# Patient Record
Sex: FEMALE | Race: WHITE
[De-identification: names, ages, dates, MRNs, and addresses within clinical notes are randomized per-mention and may not be internally consistent; named-entity substitution may affect disease eponyms.]

---

## 2018-08-09 ENCOUNTER — HOSPITAL ENCOUNTER (INPATIENT)
Dept: HOSPITAL 7 - FB.MS | Age: 78
LOS: 13 days | Discharge: NURSING, REHAB OR LONG-TERM CARE | DRG: 948 | End: 2018-08-22
Attending: FAMILY MEDICINE | Admitting: FAMILY MEDICINE
Payer: MEDICARE

## 2018-08-09 DIAGNOSIS — K58.9: ICD-10-CM

## 2018-08-09 DIAGNOSIS — Z87.440: ICD-10-CM

## 2018-08-09 DIAGNOSIS — I10: ICD-10-CM

## 2018-08-09 DIAGNOSIS — Z88.8: ICD-10-CM

## 2018-08-09 DIAGNOSIS — R53.1: Primary | ICD-10-CM

## 2018-08-09 DIAGNOSIS — Z98.890: ICD-10-CM

## 2018-08-09 DIAGNOSIS — R51: ICD-10-CM

## 2018-08-09 DIAGNOSIS — Z86.73: ICD-10-CM

## 2018-08-09 DIAGNOSIS — E87.1: ICD-10-CM

## 2018-08-09 DIAGNOSIS — H35.30: ICD-10-CM

## 2018-08-09 DIAGNOSIS — E78.00: ICD-10-CM

## 2018-08-09 DIAGNOSIS — E03.9: ICD-10-CM

## 2018-08-09 DIAGNOSIS — M81.0: ICD-10-CM

## 2018-08-09 DIAGNOSIS — M54.9: ICD-10-CM

## 2018-08-09 DIAGNOSIS — H91.90: ICD-10-CM

## 2018-08-09 DIAGNOSIS — Z66: ICD-10-CM

## 2018-08-09 DIAGNOSIS — Z11.1: ICD-10-CM

## 2018-08-09 DIAGNOSIS — F03.90: ICD-10-CM

## 2018-08-09 DIAGNOSIS — F41.9: ICD-10-CM

## 2018-08-09 DIAGNOSIS — M79.7: ICD-10-CM

## 2018-08-09 DIAGNOSIS — R47.9: ICD-10-CM

## 2018-08-09 DIAGNOSIS — M19.90: ICD-10-CM

## 2018-08-09 DIAGNOSIS — G89.29: ICD-10-CM

## 2018-08-09 DIAGNOSIS — K21.9: ICD-10-CM

## 2018-08-09 DIAGNOSIS — K29.70: ICD-10-CM

## 2018-08-09 RX ADMIN — POLYVINYL ALCOHOL SCH DROP: 14 SOLUTION/ DROPS OPHTHALMIC at 21:21

## 2018-08-09 NOTE — PCM.HP
H&P History of Present Illness





- General


Date of Service: 08/09/18


Admit Problem/Dx: 


 Admission Diagnosis/Problem





Admission Diagnosis/Problem      Stroke occurring within last month








Source of Information: Patient, Family, Old Records





- History of Present Illness


Initial Comments - Free Text/Narative: 


Assist 77-year-old female patient that had a bleeding stroke. She is 

transferred to [facility] and had a craniotomy. This is about a week ago. She is 

found to be hyponatremic and was put on a fluid restriction and odium pills. 

Her son states she was on to sodium pills twice a day until yesterday went to 3 

times a day. Patient has no concerns. She feels that she has a bit of weakness 

all over more than the right side. She has problems double vision. She denies 

dysphagia, aphasia, headaches, fevers, chills. Before she had the stroke she 

had a EGD that showed gastritis per Dr. Mohs. Still having problems with her 

stomach a little bit.





  ** left parietal head


Pain Score (Numeric/FACES): 5





- Related Data


Allergies/Adverse Reactions: 


 Allergies











Allergy/AdvReac Type Severity Reaction Status Date / Time


 


citalopram [From Celexa] Allergy  Nausea Verified 08/02/18 16:59


 


esomeprazole [From Nexium] Allergy  Other Verified 08/02/18 16:59


 


omeprazole Allergy  Other Verified 08/02/18 16:59


 


propranolol Allergy  Swelling Verified 08/02/18 16:59


 


ranitidine [From Zantac] Allergy  Other Verified 08/02/18 17:00


 


topiramate [From Topamax] Allergy  Swelling Verified 08/02/18 17:00











Home Medications: 


 Home Meds





Alendronate Sodium [Fosamax] 70 mg PO TO 08/02/18 [History]


Cholecalciferol (Vitamin D3) [D3-2000] 2,000 unit PO DAILY 08/02/18 [History]


Donepezil HCl 10 mg PO DAILY 08/02/18 [History]


Levothyroxine 75 mcg PO DAILY@0600 08/02/18 [History]


Loperamide HCl [Loperamide] 2 mg PO QID PRN 08/02/18 [History]


Omeprazole 20 mg PO DAILY@1700 08/02/18 [History]


Sodium Chloride 2 gm PO TIDMEALS 08/02/18 [History]


Sucralfate 1 gm PO QIDACANDBED 08/02/18 [History]


atorvaSTATin [Lipitor] 10 mg PO BEDTIME 08/02/18 [History]


Acetaminophen [Acetaminophen Extra Strength] 500 mg PO Q6H PRN 08/09/18 [History

]


Aded Eye Vitamin 1 cap PO DAILY 08/09/18 [History]


Alum Hydrox/Mag Hydrox/Simeth [Maalox Advanced] 15 ml PO DAILY PRN 08/09/18 [

History]


L.acidoph,Paracasei, B.lactis [Probiotic] 1 cap PO DAILY 08/09/18 [History]


LORazepam 0.5 mg PO Q8H PRN 08/09/18 [History]


Loratadine 10 mg PO DAILY 08/09/18 [History]


Magnesium Amino Acid Chelate [Magnesium] 100 mg PO DAILY 08/09/18 [History]


Mineral Oil, Light/Mineral Oil [Soothe Xp Eye Drops] 1 drop EYEBOTH TID 08/09/ 18 [History]


Psyllium [Metamucil] 1.04 gm PO DAILY PRN 08/09/18 [History]


Sodium Chloride/Sodium Bicarb [Sinus Wash Kettle Neti Pot Kit] 1 dose ANUP 

ASDIRECTED PRN 08/09/18 [History]


levETIRAcetam [Keppra] 500 mg PO Q12H 08/09/18 [History]











Past Medical History


HEENT History: Reports: Hard of Hearing, Macular Degeneration, Sinusitis


Cardiovascular History: Reports: High Cholesterol, Hypertension


Gastrointestinal History: Reports: Gastritis, GERD, Hemorrhoids, Irritable 

Bowel Syndrome, Other (See Below)


Other Gastrointestinal History: Diverticulitis


Genitourinary History: Reports: UTI, Recurrent, Other (See Below)


Other Genitourinary History: Hiatal hernia; Urethral Stricture Dilatation


OB/GYN History: Reports: Pregnancy, Other (See Below)


Other OB/BYN History: breast biopsy


Musculoskeletal History: Reports: Arthritis, Back Pain, Chronic, Fibromyalgia, 

Neck Pain, Chronic, Osteoporosis, Other (See Below)


Other Musculoskeletal History: Baker's cyst Right knee; dislocation of TMJ;


Neurological History: Reports: Brain Injury, Headaches, Chronic, Migraines, 

Other (See Below)


Other Neuro History: dementia; hx of intracerebral hemorrhage s/p 8-3-18; 

cervical spine 5 and 6 degeneration


Psychiatric History: Reports: Anxiety, Dementia, Depression


Endocrine/Metabolic History: Reports: Hypothyroidism, Osteoporosis





- Infectious Disease History


Infectious Disease History: Reports: Chicken Pox, Measles, Mumps





- Past Surgical History


Head Surgeries/Procedures: Reports: None


HEENT Surgical History: Reports: Adenoidectomy, Cataract Surgery, Tonsillectomy


Cardiovascular Surgical History: Reports: None


GI Surgical History: Reports: Cholecystectomy, Colonoscopy, EGD, Other (See 

Below)


Other GI Surgeries/Procedures: Tonsillectomy


Female  Surgical History: Reports: D&C, Hysterectomy


Endocrine Surgical History: Reports: None


Neurological Surgical History: Reports: None


Musculoskeletal Surgical History: Reports: Carpal Tunnel, Other (See Below)


Other Musculoskeletal Surgeries/Procedures:: jaskaran wrists; foot surgery





Social & Family History





- Family History


Family Medical History: Noncontributory





- Tobacco Use


Smoking Status *Q: Never Smoker


Second Hand Smoke Exposure: No





- Caffeine Use


Caffeine Use: Reports: None





- Recreational Drug Use


Recreational Drug Use: No





H&P Review of Systems





- Review of Systems:


Review Of Systems: See Below


General: Reports: No Symptoms


HEENT: Reports: Visual Changes


Pulmonary: Reports: No Symptoms


Cardiovascular: Reports: No Symptoms


Gastrointestinal: Reports: Abdominal Pain


Genitourinary: Reports: No Symptoms


Musculoskeletal: Reports: No Symptoms


Skin: Reports: No Symptoms


Psychiatric: Reports: No Symptoms


Neurological: Reports: Weakness


Hematologic/Lymphatic: Reports: No Symptoms


Immunologic: Reports: No Symptoms





Exam





- Exam


Exam: See Below





- Vital Signs


Vital Signs: 


 Last Vital Signs











Temp  98 F   08/09/18 13:50


 


Pulse  77   08/09/18 13:50


 


Resp  18   08/09/18 13:50


 


BP  113/65   08/09/18 13:50


 


Pulse Ox  99   08/09/18 14:00











Weight: 140 lb 1.6 oz





- Exam


General: Alert, Cooperative


HEENT: Hearing Intact, Mucosa Moist & Pink, Posterior Pharynx Clear


Neck: Supple, Trachea Midline


Lungs: Clear to Auscultation, Normal Respiratory Effort


Cardiovascular: Regular Rate, Regular Rhythm, Normal S1, Normal S2.  No: 

Bradycardia, Tachycardia


GI/Abdominal Exam: Normal Bowel Sounds, Soft, Non-Tender, No Organomegaly, No 

Distention, No Mass


Back Exam: Normal Inspection, Full Range of Motion


Extremities: Normal Inspection, Normal Range of Motion, Non-Tender, No Pedal 

Edema


Skin: Rash (Groin erythema)


Neurological: Normal Speech, Normal Tone, Other (Walks adequately with a walker 

and assist.)


Psychiatric: Alert, Normal Mood





- Problem List


(1) S/P craniotomy


SNOMED Code(s): 230763512, 52783911, 111962177


   ICD Code: Z98.890 - OTHER SPECIFIED POSTPROCEDURAL STATES   Status: Acute   

Current Visit: Yes   





(2) CVA (cerebrovascular accident due to intracerebral hemorrhage)


SNOMED Code(s): 929912501


   ICD Code: I61.9 - NONTRAUMATIC INTRACEREBRAL HEMORRHAGE, UNSPECIFIED   Status

: Acute   Current Visit: Yes   





(3) Gastritis


SNOMED Code(s): 5683854


   ICD Code: K29.70 - GASTRITIS, UNSPECIFIED, WITHOUT BLEEDING   Status: Acute 

  Current Visit: Yes   





(4) Tinea cruris


SNOMED Code(s): 314099225


   ICD Code: B35.6 - TINEA CRURIS   Status: Acute   Current Visit: Yes   





(5) Hyponatremia


SNOMED Code(s): 82835863


   ICD Code: E87.1 - HYPO-OSMOLALITY AND HYPONATREMIA   Status: Acute   Current 

Visit: Yes   


Problem List Initiated/Reviewed/Updated: Yes


Orders Last 24hrs: 


 Active Orders 24 hr











 Category Date Time Status


 


 Patient Status [ADT] Routine ADT  08/09/18 15:12 Active


 


 Oxygen Therapy [RC] PRN Care  08/09/18 15:12 Active


 


 Up With Assistance [RC] 09,13,17,21 Care  08/09/18 15:12 Active


 


 Vital Signs [RC] 08 Care  08/09/18 15:12 Active


 


 OT Evaluation and Treatment [CONS] Routine Cons  08/09/18 15:12 Active


 


 PT Evaluation and Treatment [CONS] Routine Cons  08/09/18 15:12 Active


 


 Regular Diet [DIET] Diet  08/09/18 Dinner Active


 


 BASIC METABOLIC PANEL,BMP [CHEM] AM Lab  08/10/18 05:11 Ordered


 


 Acetaminophen [Tylenol Extra Strength] Med  08/09/18 15:14 Active





 500 mg PO Q6H PRN   


 


 Aded Eye Vitamin Med  08/10/18 09:00 Hold





 1 cap PO DAILY   


 


 Alendronate [Fosamax] Med  08/12/18 06:00 Active





 70 mg PO To@0600   


 


 Alum Hydroxide/Mag Hydroxide [Mag-Al Susp] Med  08/09/18 15:25 Active





 15 ml PO DAILY PRN   


 


 Donepezil [Aricept] Med  08/10/18 09:00 Active





 10 mg PO DAILY   


 


 LORazepam [Ativan] Med  08/09/18 15:14 Active





 0.5 mg PO Q8H PRN   


 


 Levothyroxine Med  08/10/18 06:00 Active





 75 mcg PO DAILY@0600   


 


 Loperamide [Imodium] Med  08/09/18 15:14 Active





 2 mg PO QID PRN   


 


 Loratadine [Claritin] Med  08/10/18 09:00 Active





 10 mg PO DAILY   


 


 Magnesium Oxide Med  08/10/18 09:00 Active





 400 mg PO DAILY   


 


 Pantoprazole [ProTONIX***] Med  08/09/18 17:00 Active





 40 mg PO DAILY@1700   


 


 Polyvinyl Alcohol [LiquiTears 1.4% Ophth Soln] Med  08/09/18 21:00 Active





 0 ml EYEBOTH TID   


 


 Psyllium [Metamucil] Med  08/09/18 15:14 Active





 1.04 gm PO DAILY PRN   


 


 Sodium Chloride Med  08/09/18 18:00 Active





 2 gm PO TIDMEALS   


 


 Sodium Chloride 0.65% [Ocean Nasal Spray] Med  08/09/18 15:30 Active





 0 ml ANUP ASDIRECTED PRN   


 


 Sucralfate [Carafate] Med  08/09/18 17:30 Active





 1 gm PO QIDACANDBED   


 


 atorvaSTATin [Lipitor] Med  08/09/18 21:00 Active





 10 mg PO BEDTIME   


 


 levETIRAcetam [Keppra] Med  08/09/18 21:00 Active





 500 mg PO Q12H   


 


 Staples Sutures Removal [RC] ROUTINE Oth  08/17/18 09:00 Active


 


 Resuscitation Status Routine Resus Stat  08/09/18 15:12 Ordered








 Medication Orders





Acetaminophen (Tylenol Extra Strength)  500 mg PO Q6H PRN


   PRN Reason: Pain


   Last Admin: 08/09/18 15:55  Dose: 500 mg


Al Hydroxide/Mg Hydroxide (Mag-Al Susp)  15 ml PO DAILY PRN


   PRN Reason: HEARTBURN/INDIGESTION


Alendronate Sodium (Fosamax)  70 mg PO To@0600 KANDY


Artificial Tears (Liquitears 1.4% Ophth Soln)  0 ml EYEBOTH TID KANDY


Atorvastatin Calcium (Lipitor)  10 mg PO BEDTIME KANDY


Donepezil HCl (Aricept)  10 mg PO DAILY KANDY


Levetiracetam (Keppra)  500 mg PO Q12H KANDY


Levothyroxine Sodium (Levothyroxine)  75 mcg PO DAILY@0600 [facility]


Loperamide HCl (Imodium)  2 mg PO QID PRN


   PRN Reason: Diarrhea


Loratadine (Claritin)  10 mg PO DAILY [facility]


Lorazepam (Ativan)  0.5 mg PO Q8H PRN


   PRN Reason: Anxiety


Magnesium Oxide (Magnesium Oxide)  400 mg PO DAILY [facility]


Non-Formulary Medication (Aded Eye Vitamin)  1 cap PO DAILY [facility]


Pantoprazole Sodium (Protonix***)  40 mg PO DAILY@1700 [facility]


   Last Admin: 08/09/18 17:25  Dose: 40 mg


Psyllium Hydrophilic Mucilloid (Metamucil)  1.04 gm PO DAILY PRN


   PRN Reason: Constipation


Sodium Chloride (Ocean Nasal Spray)  0 ml ANUP ASDIRECTED PRN


   PRN Reason: DRYNESS/CONGESTION


Sodium Chloride (Sodium Chloride)  2 gm PO TIDMEALS [facility]


   Last Admin: 08/09/18 17:25  Dose: 2 gm


Sucralfate (Carafate)  1 gm PO QIDACANDBED [facility]


   Last Admin: 08/09/18 17:25  Dose: 1 gm








Assessment/Plan Comment:: 


1. Admit to swing bed. 


2. PT/OT


3. She was on fluid restriction. We'll stop it for now and continue the 2 tabs 

of sodium pills 3 times a day and daily BMP


4. Medication reviewed and restarted.


5. Regular diet


6. Up with assist


7. Essential set up an appointment in the future with nephrology.

## 2018-08-10 RX ADMIN — POLYVINYL ALCOHOL SCH DROP: 14 SOLUTION/ DROPS OPHTHALMIC at 14:53

## 2018-08-10 RX ADMIN — POLYVINYL ALCOHOL SCH DROP: 14 SOLUTION/ DROPS OPHTHALMIC at 08:51

## 2018-08-10 RX ADMIN — POLYVINYL ALCOHOL SCH DROP: 14 SOLUTION/ DROPS OPHTHALMIC at 21:06

## 2018-08-10 RX ADMIN — BUTALBITAL, ACETAMINOPHEN, AND CAFFEINE PRN TAB: 50; 325; 40 TABLET ORAL at 19:46

## 2018-08-10 NOTE — PN
DATE SEEN:  08/10/2018

 

HISTORY OF PRESENT ILLNESS:  Lydia Paul is a 77-year-old,  female,

, lives in [place].

 

Son, Shalom, daughter, Nara.

 

She was admitted postsurgically.  Presented with intracranial bleed, was seen at

[facility] in [place], underwent craniotomy, week prior to admission.  Hyponatremic

fluid restrictions, discharge for concern.

 

Primary issues were some visual disturbances and generalized weakness.  Speech

therapy under consideration.

 

DIAGNOSTIC STUDIES:  Sodium 132, chloride 97, normal GFR on 08/10/2018.

 

Voices no complaints.

 

PHYSICAL EXAMINATION:  VITAL SIGNS: 37.0, 76 is the pulse, 134/60, 14 is

respirations, 99%.  GENERAL:  Cooperative, conversant.  NEUROLOGIC:  Vision in

question.  No focal facial weakness.  Cranial nerves 2-12 are intact.

EXTREMITIES:  Well perfused.  Strength was symmetric, no focal weakness.  CHEST:

Clear.  HEART: Regular.  ABDOMEN:  Benign.  Craniotomy scar well healing.

 

ASSESSMENT:  Craniotomy, intracranial bleed.

 

PLAN:  PT, OT, complementary care and well being, , speech

therapy will be provided.

 

Job#: [id number]

DD: 08/10/2018 1136

DT: 08/10/2018 1341 LIZA/TATI

## 2018-08-11 RX ADMIN — POLYVINYL ALCOHOL SCH DROP: 14 SOLUTION/ DROPS OPHTHALMIC at 08:40

## 2018-08-11 RX ADMIN — BUTALBITAL, ACETAMINOPHEN, AND CAFFEINE PRN TAB: 50; 325; 40 TABLET ORAL at 01:20

## 2018-08-11 RX ADMIN — BUTALBITAL, ACETAMINOPHEN, AND CAFFEINE PRN TAB: 50; 325; 40 TABLET ORAL at 17:04

## 2018-08-11 RX ADMIN — POLYVINYL ALCOHOL SCH DROP: 14 SOLUTION/ DROPS OPHTHALMIC at 13:47

## 2018-08-11 RX ADMIN — BUTALBITAL, ACETAMINOPHEN, AND CAFFEINE PRN TAB: 50; 325; 40 TABLET ORAL at 08:54

## 2018-08-11 RX ADMIN — MICONAZOLE NITRATE SCH APPLIC: 20 CREAM VAGINAL at 20:49

## 2018-08-11 RX ADMIN — POLYVINYL ALCOHOL SCH DROP: 14 SOLUTION/ DROPS OPHTHALMIC at 20:49

## 2018-08-11 NOTE — PN
DATE SEEN:  08/11/2018

 

Lydia Paul is a 77-year-old,  female, admitted for post craniotomy

care.

 

Had an intracranial bleed.

 

In today for therapy and rehab.

 

Doing well.  Some evening headaches have improved, reviewed records from

[facility].  Fioricet had been beneficial.  Tylenol was reduced and discontinued

due to Tylenol potential risk, Ultram p.r.n. for pain.  Otherwise been feeling

well.  Laboratory studies none new.

 

Medications reviewed.  Timing appropriate.

 

PHYSICAL EXAMINATION:  VITAL SIGNS:  63.54 kg, 37 degrees, pulse 71, 134/60, 98%

room air.  GENERAL:  Cooperative, conversant, soft spoken.  SKIN:  Craniotomy

scar well healed.  HEENT:  Fundi benign, conjunctivae clear.  Bright tympanic

membranes.  Mouth and oropharynx clear.  CHEST:  Clear in all lung fields.

HEART:  Regular without ectopy or murmur.  ABDOMEN:  Benign.

 

ASSESSMENT:  Rehab, post craniotomy.

 

PLAN:  Medications, care and treatment appropriate, analgesics on board.

Cooperative care and well being.

 

Job#: [id number]

DD: 08/11/2018 1133

DT: 08/11/2018 1243 /TATI

## 2018-08-12 RX ADMIN — POLYVINYL ALCOHOL SCH DROP: 14 SOLUTION/ DROPS OPHTHALMIC at 14:38

## 2018-08-12 RX ADMIN — BUTALBITAL, ACETAMINOPHEN, AND CAFFEINE PRN TAB: 50; 325; 40 TABLET ORAL at 06:51

## 2018-08-12 RX ADMIN — BUTALBITAL, ACETAMINOPHEN, AND CAFFEINE PRN TAB: 50; 325; 40 TABLET ORAL at 17:41

## 2018-08-12 RX ADMIN — POLYVINYL ALCOHOL SCH DROP: 14 SOLUTION/ DROPS OPHTHALMIC at 08:02

## 2018-08-12 RX ADMIN — MICONAZOLE NITRATE SCH APPLIC: 20 CREAM VAGINAL at 20:48

## 2018-08-12 RX ADMIN — BUTALBITAL, ACETAMINOPHEN, AND CAFFEINE PRN TAB: 50; 325; 40 TABLET ORAL at 14:40

## 2018-08-12 RX ADMIN — POLYVINYL ALCOHOL SCH DROP: 14 SOLUTION/ DROPS OPHTHALMIC at 20:48

## 2018-08-12 RX ADMIN — BUTALBITAL, ACETAMINOPHEN, AND CAFFEINE PRN TAB: 50; 325; 40 TABLET ORAL at 11:28

## 2018-08-12 NOTE — PN
DATE SEEN:  08/12/2018

 

SUBJECTIVE:  Lydia Paul is a 77-year-old,  female, seen here for

followup.  Underwent a craniotomy for intracranial bleed.

 

Performed at [facility].

 

Here for swing bed purposes.

 

History of low sodium during her hospital stay.

 

LABORATORY STUDIES:  From here 08/10/2018, sodium 132, potassium 4.0, GFR

greater than 60.

 

OBJECTIVE:  VITAL SIGNS:  36.3, 94, 128/68, respirations 16, O2 saturation 96%.

GENERAL:  Soft spoken.  HEENT:  Craniotomy site, staples in place, left

hemicranium intact.  NECK:  Benign.  Thyroid small.  CHEST:  Clear in all lung

fields.  CARDIAC:  Heart regular.  ABDOMEN:  Benign.

 

ASSESSMENT:  Craniotomy, intracranial bleed.

 

PLAN:  Continue with present therapy.  Staples out on Friday.  Comfort care.

Bathing and showering consideration.

 

Job#: [id number]

DD: 08/12/2018 1138

DT: 08/12/2018 1409 LIZA/TATI

## 2018-08-13 RX ADMIN — TERBINAFINE HYDROCHLORIDE SCH APPLIC: 1 CREAM TOPICAL at 21:04

## 2018-08-13 RX ADMIN — BUTALBITAL, ACETAMINOPHEN, AND CAFFEINE PRN TAB: 50; 325; 40 TABLET ORAL at 00:33

## 2018-08-13 RX ADMIN — POLYVINYL ALCOHOL SCH DROP: 14 SOLUTION/ DROPS OPHTHALMIC at 15:38

## 2018-08-13 RX ADMIN — POLYVINYL ALCOHOL SCH DROP: 14 SOLUTION/ DROPS OPHTHALMIC at 08:50

## 2018-08-13 RX ADMIN — BUTALBITAL, ACETAMINOPHEN, AND CAFFEINE PRN TAB: 50; 325; 40 TABLET ORAL at 21:45

## 2018-08-13 RX ADMIN — TERBINAFINE HYDROCHLORIDE SCH APPLIC: 1 CREAM TOPICAL at 12:49

## 2018-08-13 RX ADMIN — MICONAZOLE NITRATE SCH APPLIC: 20 CREAM VAGINAL at 21:05

## 2018-08-13 RX ADMIN — POLYVINYL ALCOHOL SCH DROP: 14 SOLUTION/ DROPS OPHTHALMIC at 21:05

## 2018-08-13 RX ADMIN — BUTALBITAL, ACETAMINOPHEN, AND CAFFEINE PRN TAB: 50; 325; 40 TABLET ORAL at 06:38

## 2018-08-13 RX ADMIN — BUTALBITAL, ACETAMINOPHEN, AND CAFFEINE PRN TAB: 50; 325; 40 TABLET ORAL at 15:38

## 2018-08-13 NOTE — PN
DATE SEEN:  08/13/2018

 

SUBJECTIVE:  Lydia Paul is a 77-year-old  female, presently in

swing bed.

 

Underwent craniotomy at .  In for rehab purposes.

 

Appears to be a little increase in decline in mentation and memory.

 

This had been ongoing prior to that time.

 

Physical Therapy is actively involved.

 

OBJECTIVE:  VITAL SIGNS:  36.6, 68 is the pulse, 119/64, 82 is the mean blood

pressure, 16 respirations, and O2 saturation 98%.  GENERAL:  Soft spoken, less

forgettable this morning.  NECK:  Benign.  Thyroid small.  CHEST:  Clear in all

lung fields.  HEART:  No ectopy or significant murmur.  ABDOMEN:  Benign.  HEAD:

Craniotomy, left lateral parietal region, staples in good position.

 

ASSESSMENT:  Postoperative care, intracranial bleed, therapy in place.

 

PLAN:  Medications, care and treatment appropriate.  Pain appears to be

controlled.  The patient is comfortable with well being.

 

Job#: [id number]

DD: 08/13/2018 0911

DT: 08/13/2018 1026 LIZA/TATI

## 2018-08-14 RX ADMIN — BUTALBITAL, ACETAMINOPHEN, AND CAFFEINE PRN TAB: 50; 325; 40 TABLET ORAL at 21:30

## 2018-08-14 RX ADMIN — TERBINAFINE HYDROCHLORIDE SCH APPLIC: 1 CREAM TOPICAL at 21:31

## 2018-08-14 RX ADMIN — POLYVINYL ALCOHOL SCH DROP: 14 SOLUTION/ DROPS OPHTHALMIC at 21:30

## 2018-08-14 RX ADMIN — BUTALBITAL, ACETAMINOPHEN, AND CAFFEINE PRN TAB: 50; 325; 40 TABLET ORAL at 15:00

## 2018-08-14 RX ADMIN — MICONAZOLE NITRATE SCH APPLIC: 20 CREAM VAGINAL at 21:32

## 2018-08-14 RX ADMIN — POLYVINYL ALCOHOL SCH DROP: 14 SOLUTION/ DROPS OPHTHALMIC at 14:30

## 2018-08-14 RX ADMIN — TERBINAFINE HYDROCHLORIDE SCH APPLIC: 1 CREAM TOPICAL at 08:53

## 2018-08-14 RX ADMIN — POLYVINYL ALCOHOL PRN DROP: 14 SOLUTION/ DROPS OPHTHALMIC at 16:45

## 2018-08-14 RX ADMIN — POLYVINYL ALCOHOL SCH DROP: 14 SOLUTION/ DROPS OPHTHALMIC at 08:53

## 2018-08-14 RX ADMIN — BUTALBITAL, ACETAMINOPHEN, AND CAFFEINE PRN TAB: 50; 325; 40 TABLET ORAL at 08:52

## 2018-08-14 NOTE — PN
DATE SEEN:  08/14/2018

 

SUBJECTIVE:  Lydia Paul is a 77-year-old  female, seen today for

followup.  Status post craniotomy for intracranial bleed.  Neurologically, i.e.

dementia, memory a problematic issue.

 

Discharge planning to another facility, other than home, is planned.

 

OBJECTIVE:  VITAL SIGNS:  37.7, 72, 120/61, mean blood pressure 80, 20

respirations, and 95%.  GENERAL:  Soft spoken.  Eye contact was good.  Speech

was soft spoken.  HEAD:  Surgical craniotomy wound, left parietal posterior

scalp, left side, intact.  NECK:  Benign.  CHEST:  Clear.  HEART:  Regular.

 

ASSESSMENT:  Craniotomy, intracranial bleed, stable; neurological well-being

declining.

 

PLAN:  As recommended, PT therapy, ongoing care, discharge to other facility

likely planned.

 

Job#: [id number]

DD: 08/14/2018 1130

DT: 08/14/2018 1147 LIZA/TATI

## 2018-08-15 RX ADMIN — MICONAZOLE NITRATE SCH APPLIC: 20 CREAM VAGINAL at 21:19

## 2018-08-15 RX ADMIN — POLYVINYL ALCOHOL SCH DROP: 14 SOLUTION/ DROPS OPHTHALMIC at 08:34

## 2018-08-15 RX ADMIN — POLYVINYL ALCOHOL SCH DROP: 14 SOLUTION/ DROPS OPHTHALMIC at 15:11

## 2018-08-15 RX ADMIN — POLYVINYL ALCOHOL SCH DROP: 14 SOLUTION/ DROPS OPHTHALMIC at 21:18

## 2018-08-15 RX ADMIN — TERBINAFINE HYDROCHLORIDE SCH APPLIC: 1 CREAM TOPICAL at 08:34

## 2018-08-15 RX ADMIN — TERBINAFINE HYDROCHLORIDE SCH APPLIC: 1 CREAM TOPICAL at 21:17

## 2018-08-15 NOTE — PN
DATE SEEN:  08/15/2018

 

HISTORY OF PRESENT ILLNESS:  Lydia Paul is a 77-year-old,  female, seen

today for followup.

 

Swing bed status.

 

Had previous craniotomy for intracranial bleed.

 

Progress has been slow.

 

Mentation well being an issue necessitating consideration for other living

situation.  Appetite has been off, eating reluctantly.  Ambulatory skills have

been under conflict.

 

Chiropractic visit planned today.

 

PHYSICAL EXAMINATION:  VITAL SIGNS: 37.6, 68, 123/59, 16, and 94%.  GENERAL:

Soft spoken.  HEENT:  Visual field noted.  NECK:  Benign.  CHEST:  Clear in all

lung fields.  HEART:  Without ectopy or murmur.  SKIN:  Incision healing without

conflict in left parietal area.  Suture removal planned on Friday.

 

ASSESSMENT:  Postoperative care, craniotomy, intracranial bleed.

 

Progressive decline in mental well being.

 

PLAN:  Local care, comfort measures, PT, discharge planning under consideration.

 

Job#: [id number]

DD: 08/15/2018 0908

DT: 08/15/2018 1050 LIZA/TATI

## 2018-08-16 RX ADMIN — BUTALBITAL, ACETAMINOPHEN, AND CAFFEINE SCH TAB: 50; 325; 40 TABLET ORAL at 21:04

## 2018-08-16 RX ADMIN — BUTALBITAL, ACETAMINOPHEN, AND CAFFEINE SCH TAB: 50; 325; 40 TABLET ORAL at 14:26

## 2018-08-16 RX ADMIN — ALUMINUM HYDROXIDE AND MAGNESIUM HYDROXIDE PRN ML: 200; 200 SUSPENSION ORAL at 10:06

## 2018-08-16 RX ADMIN — POLYVINYL ALCOHOL PRN DROP: 14 SOLUTION/ DROPS OPHTHALMIC at 17:16

## 2018-08-16 RX ADMIN — TERBINAFINE HYDROCHLORIDE SCH APPLIC: 1 CREAM TOPICAL at 09:30

## 2018-08-16 RX ADMIN — POLYVINYL ALCOHOL SCH DROP: 14 SOLUTION/ DROPS OPHTHALMIC at 14:26

## 2018-08-16 RX ADMIN — MICONAZOLE NITRATE SCH APPLIC: 20 CREAM VAGINAL at 21:10

## 2018-08-16 RX ADMIN — TERBINAFINE HYDROCHLORIDE SCH APPLIC: 1 CREAM TOPICAL at 21:06

## 2018-08-16 RX ADMIN — POLYVINYL ALCOHOL SCH DROP: 14 SOLUTION/ DROPS OPHTHALMIC at 09:30

## 2018-08-16 RX ADMIN — POLYVINYL ALCOHOL SCH DROP: 14 SOLUTION/ DROPS OPHTHALMIC at 21:09

## 2018-08-16 RX ADMIN — BUTALBITAL, ACETAMINOPHEN, AND CAFFEINE SCH TAB: 50; 325; 40 TABLET ORAL at 09:29

## 2018-08-16 NOTE — PN
DATE SEEN:  08/16/2018

 

SUBJECTIVE:  Lydia Paul is a 77-year-old  female in a swing bed.

 

She had a craniotomy for an intracranial bleed.

 

Headache management appears problematic.  Ultram will benefit.  Fioricet under

consideration and changed, now on Tylenol and Ultram.

 

Pain, difficult to observe the exact quantity.  Memory issues and impact in

question, somewhat difficult to address adequately with the patient.

 

No recent stool.

 

Laboratory studies:  Sodium 132, 130, 127, yesterday 129.  Others are

satisfactory.

 

Fluid restrictions in place.

 

PHYSICAL EXAMINATION:  VITAL SIGNS:  62.732 kg.  37.6, 123/59, 80, 94%, and 16.

GENERAL:  Soft spoken.  Craniotomy site intact, staples clearly in place,

minimal ecchymoses.  NECK:  Some limited range of motion.  Neck:  Benign.

Thyroid small.  CHEST:  Clear in all lung fields.  No adventitious sounds.

HEART:  Regular, without ectopy or murmur.  ABDOMEN:  Benign.

 

ASSESSMENT:

1. Craniotomy.

2. Intracranial bleed.

3. Pain control.

 

PLAN:  We will switch from Fioricet to Fiorinal. Tylenol routinely. Ultram for

breakthrough pain.  Proceed accordingly.

 

Job#: [id number]

DD: 08/16/2018 0827

DT: 08/16/2018 1009 LIZA/TATI

## 2018-08-17 RX ADMIN — TERBINAFINE HYDROCHLORIDE SCH APPLIC: 1 CREAM TOPICAL at 08:14

## 2018-08-17 RX ADMIN — BUTALBITAL, ACETAMINOPHEN, AND CAFFEINE SCH TAB: 50; 325; 40 TABLET ORAL at 08:13

## 2018-08-17 RX ADMIN — BUTALBITAL, ACETAMINOPHEN, AND CAFFEINE SCH TAB: 50; 325; 40 TABLET ORAL at 03:33

## 2018-08-17 RX ADMIN — POLYVINYL ALCOHOL SCH DROP: 14 SOLUTION/ DROPS OPHTHALMIC at 15:20

## 2018-08-17 RX ADMIN — BUTALBITAL, ACETAMINOPHEN, AND CAFFEINE SCH TAB: 50; 325; 40 TABLET ORAL at 15:19

## 2018-08-17 RX ADMIN — TERBINAFINE HYDROCHLORIDE SCH APPLIC: 1 CREAM TOPICAL at 20:12

## 2018-08-17 RX ADMIN — POLYVINYL ALCOHOL SCH DROP: 14 SOLUTION/ DROPS OPHTHALMIC at 08:14

## 2018-08-17 RX ADMIN — POLYVINYL ALCOHOL SCH DROP: 14 SOLUTION/ DROPS OPHTHALMIC at 20:13

## 2018-08-17 RX ADMIN — MICONAZOLE NITRATE SCH APPLIC: 20 CREAM VAGINAL at 20:13

## 2018-08-17 RX ADMIN — BUTALBITAL, ACETAMINOPHEN, AND CAFFEINE SCH TAB: 50; 325; 40 TABLET ORAL at 20:10

## 2018-08-18 RX ADMIN — POLYVINYL ALCOHOL SCH DROP: 14 SOLUTION/ DROPS OPHTHALMIC at 14:51

## 2018-08-18 RX ADMIN — TERBINAFINE HYDROCHLORIDE SCH APPLIC: 1 CREAM TOPICAL at 20:01

## 2018-08-18 RX ADMIN — TERBINAFINE HYDROCHLORIDE SCH APPLIC: 1 CREAM TOPICAL at 08:59

## 2018-08-18 RX ADMIN — BUTALBITAL, ACETAMINOPHEN, AND CAFFEINE SCH TAB: 50; 325; 40 TABLET ORAL at 08:58

## 2018-08-18 RX ADMIN — MICONAZOLE NITRATE SCH APPLIC: 20 CREAM VAGINAL at 20:00

## 2018-08-18 RX ADMIN — BUTALBITAL, ACETAMINOPHEN, AND CAFFEINE SCH TAB: 50; 325; 40 TABLET ORAL at 14:51

## 2018-08-18 RX ADMIN — BUTALBITAL, ACETAMINOPHEN, AND CAFFEINE SCH TAB: 50; 325; 40 TABLET ORAL at 02:37

## 2018-08-18 RX ADMIN — BUTALBITAL, ACETAMINOPHEN, AND CAFFEINE SCH TAB: 50; 325; 40 TABLET ORAL at 20:02

## 2018-08-18 RX ADMIN — POLYVINYL ALCOHOL SCH DROP: 14 SOLUTION/ DROPS OPHTHALMIC at 08:59

## 2018-08-18 RX ADMIN — POLYVINYL ALCOHOL SCH DROP: 14 SOLUTION/ DROPS OPHTHALMIC at 20:10

## 2018-08-19 RX ADMIN — BUTALBITAL, ACETAMINOPHEN, AND CAFFEINE SCH TAB: 50; 325; 40 TABLET ORAL at 14:44

## 2018-08-19 RX ADMIN — BUTALBITAL, ACETAMINOPHEN, AND CAFFEINE SCH TAB: 50; 325; 40 TABLET ORAL at 03:17

## 2018-08-19 RX ADMIN — TERBINAFINE HYDROCHLORIDE SCH APPLIC: 1 CREAM TOPICAL at 20:38

## 2018-08-19 RX ADMIN — BUTALBITAL, ACETAMINOPHEN, AND CAFFEINE SCH TAB: 50; 325; 40 TABLET ORAL at 08:49

## 2018-08-19 RX ADMIN — POLYVINYL ALCOHOL SCH DROP: 14 SOLUTION/ DROPS OPHTHALMIC at 13:35

## 2018-08-19 RX ADMIN — TERBINAFINE HYDROCHLORIDE SCH APPLIC: 1 CREAM TOPICAL at 08:36

## 2018-08-19 RX ADMIN — POLYVINYL ALCOHOL SCH DROP: 14 SOLUTION/ DROPS OPHTHALMIC at 08:36

## 2018-08-19 RX ADMIN — POLYVINYL ALCOHOL SCH DROP: 14 SOLUTION/ DROPS OPHTHALMIC at 20:39

## 2018-08-19 RX ADMIN — BUTALBITAL, ACETAMINOPHEN, AND CAFFEINE SCH TAB: 50; 325; 40 TABLET ORAL at 20:36

## 2018-08-20 RX ADMIN — BUTALBITAL, ACETAMINOPHEN, AND CAFFEINE SCH TAB: 50; 325; 40 TABLET ORAL at 03:37

## 2018-08-20 RX ADMIN — POLYVINYL ALCOHOL PRN DROP: 14 SOLUTION/ DROPS OPHTHALMIC at 22:27

## 2018-08-20 RX ADMIN — POLYVINYL ALCOHOL SCH DROP: 14 SOLUTION/ DROPS OPHTHALMIC at 09:00

## 2018-08-20 RX ADMIN — BUTALBITAL, ACETAMINOPHEN, AND CAFFEINE SCH TAB: 50; 325; 40 TABLET ORAL at 21:15

## 2018-08-20 RX ADMIN — BUTALBITAL, ACETAMINOPHEN, AND CAFFEINE SCH TAB: 50; 325; 40 TABLET ORAL at 14:56

## 2018-08-20 RX ADMIN — BUTALBITAL, ACETAMINOPHEN, AND CAFFEINE SCH TAB: 50; 325; 40 TABLET ORAL at 09:00

## 2018-08-20 RX ADMIN — TERBINAFINE HYDROCHLORIDE SCH APPLIC: 1 CREAM TOPICAL at 09:01

## 2018-08-20 RX ADMIN — POLYVINYL ALCOHOL PRN DROP: 14 SOLUTION/ DROPS OPHTHALMIC at 18:10

## 2018-08-20 RX ADMIN — TERBINAFINE HYDROCHLORIDE SCH APPLIC: 1 CREAM TOPICAL at 21:19

## 2018-08-20 RX ADMIN — POLYVINYL ALCOHOL SCH DROP: 14 SOLUTION/ DROPS OPHTHALMIC at 14:56

## 2018-08-20 RX ADMIN — POLYVINYL ALCOHOL SCH DROP: 14 SOLUTION/ DROPS OPHTHALMIC at 21:19

## 2018-08-21 RX ADMIN — POLYVINYL ALCOHOL SCH DROP: 14 SOLUTION/ DROPS OPHTHALMIC at 13:42

## 2018-08-21 RX ADMIN — BUTALBITAL, ACETAMINOPHEN, AND CAFFEINE SCH TAB: 50; 325; 40 TABLET ORAL at 08:17

## 2018-08-21 RX ADMIN — BUTALBITAL, ACETAMINOPHEN, AND CAFFEINE SCH TAB: 50; 325; 40 TABLET ORAL at 14:56

## 2018-08-21 RX ADMIN — POLYVINYL ALCOHOL PRN DROP: 14 SOLUTION/ DROPS OPHTHALMIC at 10:28

## 2018-08-21 RX ADMIN — POLYVINYL ALCOHOL SCH DROP: 14 SOLUTION/ DROPS OPHTHALMIC at 20:45

## 2018-08-21 RX ADMIN — POLYVINYL ALCOHOL SCH DROP: 14 SOLUTION/ DROPS OPHTHALMIC at 08:20

## 2018-08-21 RX ADMIN — TERBINAFINE HYDROCHLORIDE SCH APPLIC: 1 CREAM TOPICAL at 08:22

## 2018-08-21 RX ADMIN — ALUMINUM HYDROXIDE AND MAGNESIUM HYDROXIDE PRN ML: 200; 200 SUSPENSION ORAL at 11:07

## 2018-08-21 RX ADMIN — BUTALBITAL, ACETAMINOPHEN, AND CAFFEINE SCH TAB: 50; 325; 40 TABLET ORAL at 19:02

## 2018-08-21 RX ADMIN — TERBINAFINE HYDROCHLORIDE SCH APPLIC: 1 CREAM TOPICAL at 20:44

## 2018-08-21 RX ADMIN — BUTALBITAL, ACETAMINOPHEN, AND CAFFEINE SCH TAB: 50; 325; 40 TABLET ORAL at 02:24

## 2018-08-21 RX ADMIN — POLYVINYL ALCOHOL PRN DROP: 14 SOLUTION/ DROPS OPHTHALMIC at 16:58

## 2018-08-22 RX ADMIN — POLYVINYL ALCOHOL SCH DROP: 14 SOLUTION/ DROPS OPHTHALMIC at 08:51

## 2018-08-22 RX ADMIN — TERBINAFINE HYDROCHLORIDE SCH APPLIC: 1 CREAM TOPICAL at 08:53

## 2018-08-22 RX ADMIN — BUTALBITAL, ACETAMINOPHEN, AND CAFFEINE SCH TAB: 50; 325; 40 TABLET ORAL at 01:35

## 2018-08-22 RX ADMIN — BUTALBITAL, ACETAMINOPHEN, AND CAFFEINE SCH TAB: 50; 325; 40 TABLET ORAL at 08:50

## 2018-08-22 NOTE — [id number].DCSUM1
**Discharge Summary





- Hospital Course


Free Text/Narrative:: 


Lydia was admitted 2 weeks ago status post craniotomy after a stroke. She's 

been undergoing physical therapy but has not deemed strong enough to be 

independent and will be discharged to the nursing home. Check a CT last week at 

the end, that shows essentially the same as it was previously.


Diagnosis: Stroke: Yes


Modified [place] Scale: No Signif.Disability Despite Sympt.Able to Carry Out 

Usual Act./Duties


Modified [place] Scale Score: 1





- Discharge Data


Discharge Date: 08/22/18


Discharge Disposition: DC/Tfer to SNF 03


Condition: Good





- Patient Summary/Data


Consults: 


 Consultations





08/09/18 15:12


OT Evaluation and Treatment [CONS] Routine 


   Please Evaluate and Treat.


   OT Reason for Consult: ADL's


   This query below is only for informational purposes and is not editable.


   Admission Diagnosis/Problem: Stroke occurring within last month


PT Evaluation and Treatment [CONS] Routine 


   Please Evaluate and Treat.


   PT Reason for Consult: Ambulation


   This query below is only for informational purposes and is not editable.


   Admission Diagnosis/Problem: Stroke occurring within last month





08/10/18 11:36


Consult to Speech Language Pathology [SLP Evaluation and Treatment] [CONS] 

Routine 


   Please Evaluate and Treat


   SLP Reason for Consult: CRANIAL BLEED AND SPEECH IMPAIRMENT


   This query below is only for informational purposes and is not editable.


   Admission Diagnosis/Problem: Stroke occurring within last month














- Patient Instructions


Diet: Heart Healthy Diet


Activity: As Tolerated


Driving: Do Not Drive





- Discharge Plan


Home Medications: 


 Home Meds





Alendronate Sodium [Fosamax] 70 mg PO ARMENDARIZ 08/02/18 [History]


Cholecalciferol (Vitamin D3) [D3-2000] 2,000 unit PO DAILY 08/02/18 [History]


Donepezil HCl 10 mg PO DAILY 08/02/18 [History]


Levothyroxine 75 mcg PO DAILY@0600 08/02/18 [History]


Loperamide HCl [Loperamide] 2 mg PO QID PRN 08/02/18 [History]


Sodium Chloride 2 gm PO TIDMEALS 08/02/18 [History]


Sucralfate 1 gm PO QIDACANDBED 08/02/18 [History]


atorvaSTATin [Lipitor] 10 mg PO BEDTIME 08/02/18 [History]


Acetaminophen [Acetaminophen Extra Strength] 500 mg PO Q6H PRN 08/09/18 [History

]


Alum Hydrox/Mag Hydrox/Simeth [Maalox Advanced] 15 ml PO DAILY PRN 08/09/18 [

History]


LORazepam 0.5 mg PO Q8H PRN 08/09/18 [History]


Loratadine 10 mg PO DAILY 08/09/18 [History]


Psyllium [Metamucil] 1.04 gm PO DAILY PRN 08/09/18 [History]


levETIRAcetam [Keppra] 500 mg PO Q12H 08/09/18 [History]


Acetaminophen/Butalbital/Caff [Fioricet 325-50-40 MG] 1 tab PO Q6H #0 tablet 08/ 22/18 [Rx]


Magnesium Oxide 400 mg PO DAILY  tablet 08/22/18 [Rx]


Pantoprazole [ProTONIX***] 40 mg PO DAILY@1700  tab.cr 08/22/18 [Rx]


Polyvinyl Alcohol [LiquiTears 1.4% Ophth Soln] 0 ml EYEBOTH Q1H PRN  bottle 08/ 22/18 [Rx]











- Discharge Summary/Plan Comment


DC Time >30 min.: Yes





- General Info


Date of Service: 08/22/18


Admission Dx/Problem (Free Text: 


 Admission Diagnosis/Problem





Admission Diagnosis/Problem      Stroke occurring within last month








Functional Status: Reports: Tolerating Diet





- Review of Systems


General: Reports: No Symptoms





- Patient Data


Vitals - Most Recent: 


 Last Vital Signs











Temp  98.0 F   08/22/18 07:20


 


Pulse  70   08/22/18 07:20


 


Resp  18   08/22/18 07:20


 


BP  122/59 L  08/22/18 07:20


 


Pulse Ox  99   08/22/18 07:20











Weight - Most Recent: 62.732 kg


I&O - Last 24 hours: 


 Intake & Output











 08/21/18 08/22/18 08/22/18





 22:59 06:59 14:59


 


Intake Total 200 220 


 


Output Total   200


 


Balance 200 220 -200











Med Orders - Current: 


 Current Medications





Acetaminophen/Butalbital/Caffeine (Fioricet 325-50-40 Mg)  1 tab PO Q6H [facility]


   Last Admin: 08/22/18 08:50 Dose:  1 tab


Al Hydroxide/Mg Hydroxide (Mag-Al Susp)  15 ml PO DAILY PRN


   PRN Reason: HEARTBURN/INDIGESTION


   Last Admin: 08/21/18 11:07 Dose:  15 ml


Alendronate Sodium (Fosamax)  70 mg PO Armendariz@0600 [facility]


   Last Admin: 08/19/18 06:36 Dose:  70 mg


Artificial Tears (Liquitears 1.4% Ophth Soln)  0 ml EYEBOTH TID [facility]


   Last Admin: 08/22/18 08:51 Dose:  1 drop


Artificial Tears (Liquitears 1.4% Ophth Soln)  0 ml EYEBOTH Q1H PRN


   PRN Reason: Dry Eyes


   Last Admin: 08/21/18 16:58 Dose:  1 drop


Atorvastatin Calcium (Lipitor)  10 mg PO BEDTIME [facility]


   Last Admin: 08/21/18 20:45 Dose:  10 mg


Bisacodyl (Dulcolax)  5 mg PO DAILY PRN


   PRN Reason: Constipation


   Last Admin: 08/22/18 01:35 Dose:  5 mg


Donepezil HCl (Aricept)  10 mg PO DAILY [facility]


   Last Admin: 08/22/18 09:01 Dose:  10 mg


Levetiracetam (Keppra)  500 mg PO Q12H [facility]


   Last Admin: 08/22/18 09:01 Dose:  500 mg


Levothyroxine Sodium (Levothyroxine)  75 mcg PO DAILY@0600 [facility]


   Last Admin: 08/22/18 05:54 Dose:  75 mcg


Loperamide HCl (Imodium)  2 mg PO QID PRN


   PRN Reason: Diarrhea


Loratadine (Claritin)  10 mg PO DAILY [facility]


   Last Admin: 08/22/18 09:00 Dose:  10 mg


Lorazepam (Ativan)  0.5 mg PO Q8H PRN


   PRN Reason: Anxiety


   Last Admin: 08/14/18 14:59 Dose:  0.5 mg


Magnesium Oxide (Magnesium Oxide)  400 mg PO DAILY [facility]


   Last Admin: 08/22/18 09:00 Dose:  400 mg


Non-Formulary Medication (Aded Eye Vitamin)  1 cap PO DAILY [facility]


Pantoprazole Sodium (Protonix***)  40 mg PO DAILY@1700 [facility]


   Last Admin: 08/21/18 17:24 Dose:  40 mg


Psyllium Hydrophilic Mucilloid (Metamucil)  1.04 gm PO DAILY PRN


   PRN Reason: Constipation


Sodium Chloride (Ocean Nasal Spray)  0 ml ANUP ASDIRECTED PRN


   PRN Reason: DRYNESS/CONGESTION


Sodium Chloride (Sodium Chloride)  2 gm PO TIDMEALS [facility]


   Last Admin: 08/22/18 09:00 Dose:  2 gm


Sucralfate (Carafate)  1 gm PO 0700,1100,1700,2100 [facility]


   Last Admin: 08/22/18 05:59 Dose:  1 gm


Terbinafine HCl (Lamisil At 1% Crm)  0 gm TOP BID [facility]


   Last Admin: 08/22/18 08:53 Dose:  1 applic





Discontinued Medications





Acetaminophen (Tylenol Extra Strength)  500 mg PO Q6H PRN


   PRN Reason: Pain


   Last Admin: 08/09/18 15:55 Dose:  500 mg


Acetaminophen (Tylenol Extra Strength)  1,000 mg PO TID [facility]


   Last Admin: 08/10/18 14:53 Dose:  1,000 mg


Acetaminophen (Tylenol Arthritis Pain)  1,300 mg PO BID [facility]


   Last Admin: 08/15/18 21:23 Dose:  1,300 mg


Acetaminophen/Butalbital/Caffeine (Fioricet 325-50-40 Mg)  1 tab PO Q6H PRN


   PRN Reason: Pain


   Last Admin: 08/14/18 21:30 Dose:  1 tab


Acetaminophen/Butalbital/Caffeine (Fioricet 325-50-40 Mg)  2 tab PO Q6H PRN


   PRN Reason: Pain


   Last Admin: 08/14/18 08:52 Dose:  2 tab


Acetaminophen/Butalbital/Caffeine (Fioricet 325-50-40 Mg)  1 tab PO Q6H [facility]


   Last Admin: 08/21/18 08:17 Dose:  1 tab


Miconazole (Miconazole 2% Vaginal)  1 gm VAG DAILY [facility]


   Stop: 08/17/18 09:01


Miconazole (Miconazole 2% Vaginal)  1 gm TOP DAILY ONE


   Stop: 08/11/18 05:46


   Last Admin: 08/11/18 05:44 Dose:  1 applic


Miconazole (Miconazole 2% Vaginal)  1 gm TOP BEDTIME [facility]


   Stop: 08/18/18 21:01


   Last Admin: 08/18/18 20:00 Dose:  1 applic


Sucralfate (Carafate)  1 gm PO QIDACANDBED [facility]


   Last Admin: 08/17/18 11:20 Dose:  1 gm


Tramadol HCl (Ultram)  50 mg PO Q6H PRN


   PRN Reason: Pain


   Last Admin: 08/13/18 18:58 Dose:  50 mg


Tramadol HCl (Ultram)  50 mg PO Q6H [facility]


   Last Admin: 08/16/18 04:18 Dose:  50 mg


Tuberculin PPD (Aplisol)  5 unit IDERM ONETIME ONE


   Stop: 08/22/18 09:01


   Last Admin: 08/22/18 08:56 Dose:  5 unit











- Exam


General: Reports: Alert

## 2018-08-27 ENCOUNTER — HOSPITAL ENCOUNTER (EMERGENCY)
Dept: HOSPITAL 7 - FB.ED | Age: 78
Discharge: OTHER INSTITUTION | End: 2018-08-27
Payer: MEDICARE

## 2018-08-27 DIAGNOSIS — Z88.1: ICD-10-CM

## 2018-08-27 DIAGNOSIS — I10: ICD-10-CM

## 2018-08-27 DIAGNOSIS — Z79.899: ICD-10-CM

## 2018-08-27 DIAGNOSIS — Z88.8: ICD-10-CM

## 2018-08-27 DIAGNOSIS — I61.0: Primary | ICD-10-CM

## 2018-08-27 PROCEDURE — 96374 THER/PROPH/DIAG INJ IV PUSH: CPT

## 2018-08-27 PROCEDURE — 70450 CT HEAD/BRAIN W/O DYE: CPT

## 2018-08-27 PROCEDURE — 99285 EMERGENCY DEPT VISIT HI MDM: CPT

## 2018-08-27 NOTE — EDM.PDOC
ED HPI GENERAL MEDICAL PROBLEM





- General


Chief Complaint: Upper Extremity Injury/Pain


Stated Complaint: RIGHT SIDE WEAKNESS, SEVERE HEADACHE


Time Seen by Provider: 08/27/18 18:15


Source of Information: Reports: Patient, Family


History Limitations: Reports: No Limitations





- History of Present Illness


INITIAL COMMENTS - FREE TEXT/NARRATIVE: 





Lydia returns to [facility] ED with the appearance of R sided weakness at the SNF 

this afternoon. She has been leaning to the right, avoiding use of RUE, and 

unable to transfer on the RLE. Nursing staff notified the family who requested 

evaluation this evening. Current VS are stable, with obvious clinical R 

hemiparesis. 


  ** Headache


Pain Score (Numeric/FACES): 5





- Related Data


 Allergies











Allergy/AdvReac Type Severity Reaction Status Date / Time


 


citalopram [From Celexa] Allergy  Nausea Verified 08/27/18 17:54


 


esomeprazole [From Nexium] Allergy  Other Verified 08/27/18 17:54


 


omeprazole Allergy  Other Verified 08/27/18 17:54


 


propranolol Allergy  Swelling Verified 08/27/18 17:54


 


ranitidine [From Zantac] Allergy  Other Verified 08/27/18 17:54


 


topiramate [From Topamax] Allergy  Swelling Verified 08/27/18 17:54











Home Meds: 


 Home Meds





Alendronate Sodium [Fosamax] 70 mg PO TO 08/02/18 [History]


Cholecalciferol (Vitamin D3) [D3-2000] 2,000 unit PO DAILY 08/02/18 [History]


Donepezil HCl 10 mg PO DAILY 08/02/18 [History]


Levothyroxine 75 mcg PO DAILY@0600 08/02/18 [History]


Loperamide HCl [Loperamide] 2 mg PO QID PRN 08/02/18 [History]


Sodium Chloride 2 gm PO TIDMEALS 08/02/18 [History]


Sucralfate 1 gm PO QIDACANDBED 08/02/18 [History]


atorvaSTATin [Lipitor] 10 mg PO BEDTIME 08/02/18 [History]


Acetaminophen [Acetaminophen Extra Strength] 500 mg PO Q6H PRN 08/09/18 [History

]


Alum Hydrox/Mag Hydrox/Simeth [Maalox Advanced] 15 ml PO DAILY PRN 08/09/18 [

History]


LORazepam 0.5 mg PO Q8H PRN 08/09/18 [History]


Loratadine 10 mg PO DAILY 08/09/18 [History]


levETIRAcetam [Keppra] 500 mg PO Q12H 08/09/18 [History]


Magnesium Oxide 400 mg PO DAILY  tablet 08/22/18 [Rx]


Pantoprazole [ProTONIX***] 40 mg PO DAILY@1700  tab.cr 08/22/18 [Rx]


Polyvinyl Alcohol [LiquiTears 1.4% Ophth Soln] 0 ml EYEBOTH Q1H PRN  bottle 08/ 22/18 [Rx]


Butalbital/Acetaminophen [Butalbital-Acetaminophn ] 1 tab PO Q6H 08/27/18 

[History]


Polyethylene Glycol 3350 [MiraLAX] 17 gm PO DAILY 08/27/18 [History]


Polyvinyl Alcohol [Artificial Tears] 1 drop EYEBOTH QID 08/27/18 [History]


traMADol [Ultram] 25 mg PO Q6H PRN 08/27/18 [History]











Past Medical History


HEENT History: Reports: Hard of Hearing, Macular Degeneration, Sinusitis


Cardiovascular History: Reports: High Cholesterol, Hypertension


Gastrointestinal History: Reports: Gastritis, GERD, Hemorrhoids, Irritable 

Bowel Syndrome, Other (See Below)


Other Gastrointestinal History: Diverticulitis


Genitourinary History: Reports: UTI, Recurrent, Other (See Below)


Other Genitourinary History: Hiatal hernia; Urethral Stricture Dilatation


OB/GYN History: Reports: Pregnancy, Other (See Below)


Other OB/GYN History: breast biopsy


Musculoskeletal History: Reports: Arthritis, Back Pain, Chronic, Fibromyalgia, 

Neck Pain, Chronic, Osteoporosis, Other (See Below)


Other Musculoskeletal History: Baker's cyst Right knee; dislocation of TMJ;


Neurological History: Reports: Brain Injury, Headaches, Chronic, Migraines, 

Other (See Below)


Other Neuro History: dementia; hx of intracerebral hemorrhage s/p 8-3-18; 

cervical spine 5 and 6 degeneration


Psychiatric History: Reports: Anxiety, Dementia, Depression


Endocrine/Metabolic History: Reports: Hypothyroidism, Osteoporosis





- Infectious Disease History


Infectious Disease History: Reports: Chicken Pox, Measles, Mumps





- Past Surgical History


Head Surgeries/Procedures: Reports: None


HEENT Surgical History: Reports: Adenoidectomy, Cataract Surgery, Tonsillectomy


Cardiovascular Surgical History: Reports: None


GI Surgical History: Reports: Cholecystectomy, Colonoscopy, EGD, Other (See 

Below)


Other GI Surgeries/Procedures: Tonsillectomy


Female  Surgical History: Reports: D&C, Hysterectomy


Endocrine Surgical History: Reports: None


Neurological Surgical History: Reports: Intracranial


Musculoskeletal Surgical History: Reports: Carpal Tunnel, Other (See Below)


Other Musculoskeletal Surgeries/Procedures:: jaskaran wrists; foot surgery





Social & Family History





- Family History


Family Medical History: Noncontributory





- Tobacco Use


Smoking Status *Q: Unknown Ever Smoked


Second Hand Smoke Exposure: No





- Caffeine Use


Caffeine Use: Reports: Coffee





- Recreational Drug Use


Recreational Drug Use: No





Review of Systems





- Review of Systems


Review Of Systems: ROS reveals no pertinent complaints other than HPI.





ED EXAM, GENERAL





- Physical Exam


Exam: See Below


Exam Limited By: Physical Impairment (R hemiparesis)


General Appearance: Alert, WD/WN, No Apparent Distress


Eye Exam: Bilateral Eye: Normal Inspection, PERRL, Vision Changes (field cut to 

R)


Ears: Normal External Exam


Nose: Normal Inspection


Throat/Mouth: Normal Inspection, Normal Oropharynx, Normal Voice, No Airway 

Compromise


Head: Normocephalic, Other (healing scar from craniotomy L occiput)


Neck: Normal Inspection, Supple, Non-Tender


Respiratory/Chest: Lungs Clear, Normal Breath Sounds


Cardiovascular: Regular Rate, Rhythm, No Murmur


GI/Abdominal: Normal Bowel Sounds, Non-Tender, No Organomegaly, No Distention, 

No Mass


 (Female) Exam: Deferred


Rectal (Female) Exam: Deferred


Back Exam: Normal Inspection, Other (R sided weakness producing truncal 

instability)


Extremities: Normal Inspection, Other (R hemiparesis)


Neurological: Alert, CN II-XII Intact, Slow to Respond, Memory Loss Recent 

Events


Psychiatric: Normal Mood, Flat Affect


Skin Exam: Warm, Dry, Intact, Pallor


Lymphatic: No Adenopathy





Course





- Vital Signs


Text/Narrative:: 





Following assessment at the [facility] ED, a noncontrast Head CT was performed, 

revealing a new 3.5 cm x 2.5 cm intracerebral hemorrhage with some edema. Case 

was discussed with Dr Sanchez Neurosurgery at [facility], and she will be 

transferred by ground ambulance for assessment. 


Last Recorded V/S: 


 Last Vital Signs











Temp  36.8 C   08/27/18 17:50


 


Pulse  75   08/27/18 17:50


 


Resp  18   08/27/18 17:50


 


BP  143/73 H  08/27/18 17:50


 


Pulse Ox  100   08/27/18 17:50














- Orders/Labs/Meds


Orders: 


 Active Orders 24 hr











 Category Date Time Status


 


 Head wo Cont [CT] Urgent Exams  08/27/18 18:22 Taken














Departure





- Departure


Time of Disposition: 19:31


Disposition: DC/Tfer to Other 70


Condition: Poor


Clinical Impression: 


CVA (cerebrovascular accident due to intracerebral hemorrhage)


Qualifiers:


 Intracerebral hemorrhage etiology: nontraumatic Cerebral hemorrhage location: 

cerebral hemisphere, subcortical portion Laterality: left Qualified Code(s): 

I61.0 - Nontraumatic intracerebral hemorrhage in hemisphere, subcortical








- Discharge Information


*PRESCRIPTION DRUG MONITORING PROGRAM REVIEWED*: Not Applicable


*COPY OF PRESCRIPTION DRUG MONITORING REPORT IN PATIENT LARRY: Not Applicable


Referrals: 


Strand,Duane, MD [Primary Care Provider] - 


Forms:  ED Department Discharge





- Problem List & Annotations


(1) CVA (cerebrovascular accident due to intracerebral hemorrhage)


SNOMED Code(s): 767799351


   Code(s): I61.9 - NONTRAUMATIC INTRACEREBRAL HEMORRHAGE, UNSPECIFIED   Status

: Acute   Current Visit: Yes   Annotation/Comment:: Transfer to [facility] 

Neurosurgery Dept for management.   


Qualifiers: 


   Intracerebral hemorrhage etiology: nontraumatic   Cerebral hemorrhage 

location: cerebral hemisphere, subcortical portion   Laterality: left   

Qualified Code(s): I61.0 - Nontraumatic intracerebral hemorrhage in hemisphere, 

subcortical   





- My Orders


Last 24 Hours: 


My Active Orders





08/27/18 18:22


Head wo Cont [CT] Urgent 














- Assessment/Plan


Last 24 Hours: 


My Active Orders





08/27/18 18:22


Head wo Cont [CT] Urgent 











Plan: 





Per Neurosurgery.